# Patient Record
Sex: FEMALE | Race: OTHER | NOT HISPANIC OR LATINO | URBAN - METROPOLITAN AREA
[De-identification: names, ages, dates, MRNs, and addresses within clinical notes are randomized per-mention and may not be internally consistent; named-entity substitution may affect disease eponyms.]

---

## 2017-05-20 ENCOUNTER — EMERGENCY (EMERGENCY)
Facility: HOSPITAL | Age: 37
LOS: 1 days | Discharge: HOME | End: 2017-05-20
Attending: EMERGENCY MEDICINE
Payer: MEDICAID

## 2017-05-20 VITALS
HEIGHT: 69 IN | OXYGEN SATURATION: 98 % | WEIGHT: 214.95 LBS | SYSTOLIC BLOOD PRESSURE: 143 MMHG | TEMPERATURE: 98 F | DIASTOLIC BLOOD PRESSURE: 90 MMHG | RESPIRATION RATE: 18 BRPM | HEART RATE: 98 BPM

## 2017-05-20 LAB — HCG UR QL: NEGATIVE — SIGNIFICANT CHANGE UP

## 2017-05-20 PROCEDURE — 99284 EMERGENCY DEPT VISIT MOD MDM: CPT | Mod: 25

## 2017-05-20 PROCEDURE — 73590 X-RAY EXAM OF LOWER LEG: CPT

## 2017-05-20 PROCEDURE — 90715 TDAP VACCINE 7 YRS/> IM: CPT

## 2017-05-20 PROCEDURE — 90471 IMMUNIZATION ADMIN: CPT

## 2017-05-20 PROCEDURE — 12004 RPR S/N/AX/GEN/TRK7.6-12.5CM: CPT

## 2017-05-20 PROCEDURE — 96372 THER/PROPH/DIAG INJ SC/IM: CPT | Mod: XU

## 2017-05-20 PROCEDURE — 73590 X-RAY EXAM OF LOWER LEG: CPT | Mod: 26,50

## 2017-05-20 PROCEDURE — 81025 URINE PREGNANCY TEST: CPT

## 2017-05-20 RX ORDER — IBUPROFEN 200 MG
1 TABLET ORAL
Qty: 20 | Refills: 0 | OUTPATIENT
Start: 2017-05-20 | End: 2017-05-25

## 2017-05-20 RX ORDER — MORPHINE SULFATE 50 MG/1
6 CAPSULE, EXTENDED RELEASE ORAL ONCE
Qty: 0 | Refills: 0 | Status: DISCONTINUED | OUTPATIENT
Start: 2017-05-20 | End: 2017-05-20

## 2017-05-20 RX ORDER — RABIES VACC, HUMAN DIPLOID/PF 2.5 UNIT
1 VIAL (EA) INTRAMUSCULAR ONCE
Qty: 0 | Refills: 0 | Status: DISCONTINUED | OUTPATIENT
Start: 2017-05-20 | End: 2017-05-20

## 2017-05-20 RX ORDER — TETANUS TOXOID, REDUCED DIPHTHERIA TOXOID AND ACELLULAR PERTUSSIS VACCINE, ADSORBED 5; 2.5; 8; 8; 2.5 [IU]/.5ML; [IU]/.5ML; UG/.5ML; UG/.5ML; UG/.5ML
0.5 SUSPENSION INTRAMUSCULAR ONCE
Qty: 0 | Refills: 0 | Status: COMPLETED | OUTPATIENT
Start: 2017-05-20 | End: 2017-05-20

## 2017-05-20 RX ADMIN — MORPHINE SULFATE 6 MILLIGRAM(S): 50 CAPSULE, EXTENDED RELEASE ORAL at 23:28

## 2017-05-20 RX ADMIN — Medication 1 TABLET(S): at 23:28

## 2017-05-20 RX ADMIN — TETANUS TOXOID, REDUCED DIPHTHERIA TOXOID AND ACELLULAR PERTUSSIS VACCINE, ADSORBED 0.5 MILLILITER(S): 5; 2.5; 8; 8; 2.5 SUSPENSION INTRAMUSCULAR at 19:54

## 2017-05-20 NOTE — ED STATDOCS - PROGRESS NOTE DETAILS
patient re-evaluated c/o dog bite x 6 hours ago, was walking outside home and neighbors dog (omid) bite her along b/l calfs, XR shows soft tissue injury, right calf + 3 puncture wounds, and left calf with 10cm laceration, wound irrigated with saline, betadine, peroxide and scrub brush, loose sutures placed in sterile fashion to approximate gapping wound, educated patient on signs and symptoms of infection to return to ED immediately as may need IV abx, tetanus give and pain meds, [facility] form filled out, spoke to Ronda from [facility], reccomended against rabies or IGG will attempt to find dog, given [facility] [employer] for f/u.

## 2017-05-20 NOTE — ED STATDOCS - OBJECTIVE STATEMENT
38 y/o F pt presents to ED c/o bilateral calf pain s/p dog bite. Pt reports she was going upstairs when a dog bit both of her calves. She is unsure whose dog it was but believes it was her landlords. Pt tied shirts above bite wounds to try to stop the bleeding. She notes her last Tetanus shot was about 10 years ago. Pt denies LOC, CP, SOB, dizziness, nausea, and vomiting. No further complaints at this time. NKDA.

## 2017-05-20 NOTE — ED ADULT TRIAGE NOTE - CHIEF COMPLAINT QUOTE
States got bit by dog on both of calves. Alert and oriented x4. Speech clear, logical, and coherent.

## 2017-05-20 NOTE — ED ADULT NURSE NOTE - OBJECTIVE STATEMENT
Pt states she was walking up the stairs of her house when she saw a neighborhood dog coming for her and dog bit bilateral back of calves, blood soaking through gauze, pt sitting in wheelchair, states dog had collar on.

## 2017-05-20 NOTE — ED STATDOCS - ATTENDING CONTRIBUTION TO CARE
I, Sheyla Silver, performed the initial face to face bedside interview with this patient regarding history of present illness, review of symptoms and relevant past medical, social and family history.  I completed an independent physical examination.  I was the initial provider who evaluated this patient. I have signed out the follow up of any pending tests (i.e. labs, radiological studies) to the ACP.  I have communicated the patient’s plan of care and disposition with the ACP.  The history, relevant review of systems, past medical and surgical history, medical decision making, and physical examination was documented by the scribe in my presence and I attest to the accuracy of the documentation.

## 2017-05-20 NOTE — ED ADULT NURSE REASSESSMENT NOTE - NS ED NURSE REASSESS COMMENT FT1
Pt able to ambulate steadily and safely w/out assistance, provided crutches and educated pt on use and verbalized understanding, preparing for d/c home with

## 2017-05-20 NOTE — ED STATDOCS - NS ED MD SCRIBE ATTENDING SCRIBE SECTIONS
PAST MEDICAL/SURGICAL/SOCIAL HISTORY/HIV/HISTORY OF PRESENT ILLNESS/VITAL SIGNS( Pullset)/PHYSICAL EXAM/DISPOSITION/REVIEW OF SYSTEMS

## 2017-05-20 NOTE — ED STATDOCS - PMH
SVT (supraventricular tachycardia)  began 2007, usually happens once a year, breaks with [employer]

## 2018-02-06 ENCOUNTER — OUTPATIENT (OUTPATIENT)
Dept: OUTPATIENT SERVICES | Facility: HOSPITAL | Age: 38
LOS: 1 days | End: 2018-02-06
Payer: COMMERCIAL

## 2018-02-06 ENCOUNTER — APPOINTMENT (OUTPATIENT)
Dept: MAMMOGRAPHY | Facility: CLINIC | Age: 38
End: 2018-02-06
Payer: MEDICAID

## 2018-02-06 DIAGNOSIS — Z00.8 ENCOUNTER FOR OTHER GENERAL EXAMINATION: ICD-10-CM

## 2018-02-06 PROCEDURE — 77067 SCR MAMMO BI INCL CAD: CPT | Mod: 26

## 2018-02-06 PROCEDURE — 77067 SCR MAMMO BI INCL CAD: CPT

## 2018-02-06 PROCEDURE — 77063 BREAST TOMOSYNTHESIS BI: CPT | Mod: 26

## 2018-02-06 PROCEDURE — 77063 BREAST TOMOSYNTHESIS BI: CPT
